# Patient Record
(demographics unavailable — no encounter records)

---

## 2025-04-18 NOTE — HISTORY OF PRESENT ILLNESS
[FreeTextEntry1] : CC : [facility] evaluation, due to symptoms   HPI: 34 y old F With PMH Nausea, Constipation evaluated by GI  EGD no sig changes, Depression, ADHD- since age 18, Tx Prestique 100 mg dails, falls asleep easily but wakes up frequently and wakes  up tired   2/24/2025  chest tightness BL hand swelling, back itching tx IV steroids suggested [facility] evaluation   + Joint pain, no sig swelling, Tx possible  Dequervein tendinitis tx Steroids   Methyl prednisone dose pack and splint evaluated by Orthopedics at [facility]  neck, back and shoulder stiffness lasting up to hours  + Facial Acne  occasional rash mostly stomach with sun exposure, occasional mouth ulcer - usually prior to URI  , + Dry mouth and Dry eyes --> ophthalmology  no personal or family history of  psoriasis,  frequent Nausea , feeling reflux, evaluated by GI EGD no sig changes, mostly constipation occasional diarrhea, no blood in the stool no trouble swallowing  no sig hair loss,  occasional chest tightness evaluated  , and no sig Cardiac or pulmonary , no history of asthma  no history of pregnancy   ALL NKDA FH:  mother [place] and [place] , aunt moms side Lupus Sister thyroid disease SH: alcohol socially

## 2025-04-18 NOTE — PHYSICAL EXAM
[General Appearance - In No Acute Distress] : in no acute distress [PERRL With Normal Accommodation] : pupils were equal in size, round, and reactive to light [Examination Of The Oral Cavity] : the lips and gums were normal [Both Tympanic Membranes Were Examined] : both tympanic membranes were normal [Auscultation Breath Sounds / Voice Sounds] : lungs were clear to auscultation bilaterally [Respiration, Rhythm And Depth] : normal respiratory rhythm and effort [Chest Palpation] : palpation of the chest revealed no abnormalities [Heart Rate And Rhythm] : heart rate was normal and rhythm regular [Heart Sounds] : normal S1 and S2 [Edema] : there was no peripheral edema [Bowel Sounds] : normal bowel sounds [Abdomen Soft] : soft [Abdomen Tenderness] : non-tender [No Spinal Tenderness] : no spinal tenderness [Musculoskeletal - Swelling] : no joint swelling seen [Motor Tone] : muscle strength and tone were normal [] : no rash

## 2025-05-15 NOTE — ASSESSMENT
[FreeTextEntry1] : A and P 34 y old F with PMH of GI symptoms Nausea , Constipation evaluated by GI with EGD no sig changes, also Depression and ADHD since 2/2025 joint pain bl hand swelling and stiffness , s/p Steroid injection for R DeQuervain tenditis followed at [facility] has splint improved symptoms, no other current joint pain or swelling  -4/18/2025  negative DORITA  and rest of the sub serologies , DS DNA, Sjogren abbs, KIM, Centromere abs, SCL 70 abs,  negative Thyroid abs  negative RF and CCP ,   also negative Celiac abs normal Tryptase  negative HLA B 27  negative ANCA  normal ACE normal C3 and C4  Quantiferon - Indeterminate no sympotms of Active TB-- can repeat with next blood work  4/18/25  Pelvic x ray : no sig change of hip and SI joints  -follow up 2-3 month or sooner if symptoms get worse or new symptoms

## 2025-05-15 NOTE — HISTORY OF PRESENT ILLNESS
[___ Week(s) Ago] : [unfilled] week(s) ago [FreeTextEntry1] : 5/14/2025  follow up   PMH of ADHD , with Nausea Constipation evaluated by GI with EGD no sig changes, also intermittent rash , BL hand swelling tx with steroids  Tx for R Hand Dequervain tendinitis at [facility] improved with steroid injection and using Splint  no other joint pain or swelling no morning stiffness  4/18/2025  negative DORITA  and rest of the sub serologies , DS DNA, Sjogren abbs, KIM, Centromere abs, SCL 70 abs,  negative Thyroid abs  negative RF and CCP ,   also negative Celiac abs normal Tryptase  negative HLA B 27  negative ANCA  normal ACE normal C3 and C4  Quantiferon - Indeterminate no sympotms of Active TB-- can repeat with next blood work  4/18/25  Pelvic x ray : no sig change of hip and SI joints